# Patient Record
Sex: FEMALE | Race: WHITE | Employment: UNEMPLOYED | URBAN - METROPOLITAN AREA
[De-identification: names, ages, dates, MRNs, and addresses within clinical notes are randomized per-mention and may not be internally consistent; named-entity substitution may affect disease eponyms.]

---

## 2023-05-30 SDOH — HEALTH STABILITY: PHYSICAL HEALTH
ON AVERAGE, HOW MANY DAYS PER WEEK DO YOU ENGAGE IN MODERATE TO STRENUOUS EXERCISE (LIKE A BRISK WALK)?: PATIENT DECLINED

## 2023-05-31 ENCOUNTER — OFFICE VISIT (OUTPATIENT)
Dept: ORTHOPEDIC SURGERY | Age: 48
End: 2023-05-31
Payer: COMMERCIAL

## 2023-05-31 VITALS — HEIGHT: 68 IN | BODY MASS INDEX: 37.13 KG/M2 | RESPIRATION RATE: 14 BRPM | WEIGHT: 245 LBS

## 2023-05-31 DIAGNOSIS — M79.672 LEFT FOOT PAIN: Primary | ICD-10-CM

## 2023-05-31 PROCEDURE — 99204 OFFICE O/P NEW MOD 45 MIN: CPT | Performed by: PHYSICIAN ASSISTANT

## 2023-05-31 NOTE — PROGRESS NOTES
[address] [facility], [address] [place] [address] Road [address] [place] [address], [address] [facility]           Dept Phone: [phone]           Dept Fax:  [phone] [address] [facility]           Kushal Marte          Dept Phone: [phone]           Dept Fax:  [phone]      Chief Compliant:  Chief Complaint   Patient presents with    Foot Pain     left        History of Present Illness: This is a 57 y.o. female who presents to the clinic today for evaluation of approximately 1-1/2-week history of left foot pain. Patient reports pain started while setting up for her son's graduation party she does not recall any specific injury or trauma around the time of the onset of pain. Localized pain most severely to the dorsal aspect of the midfoot pointing to the area over the third and fourth metatarsals proximally. She denies any significant bruising but does note pretty consistent swelling to the foot however states she had this even before she had pain. No history of stress fracture or surgery to this left foot. Does have a history of bunionectomy on the right foot in the past..     Past History:  No current outpatient medications on file.   No Known Allergies  Social History     Socioeconomic History    Marital status:      Spouse name: Not on file    Number of children: Not on file    Years of education: Not on file    Highest education level: Not on file   Occupational History    Not on file   Tobacco Use    Smoking status: Never    Smokeless tobacco: Never   Substance and Sexual Activity    Alcohol use: Not Currently    Drug use: Never    Sexual activity: Not on file   Other Topics Concern    Not on file   Social History Narrative    Not on file     Social Determinants of Health     Financial Resource Strain: Not on file   Food Insecurity: Not on file   Transportation Needs: Not on

## 2023-06-21 ENCOUNTER — OFFICE VISIT (OUTPATIENT)
Dept: ORTHOPEDIC SURGERY | Age: 48
End: 2023-06-21
Payer: COMMERCIAL

## 2023-06-21 VITALS — HEIGHT: 68 IN | BODY MASS INDEX: 37.13 KG/M2 | RESPIRATION RATE: 14 BRPM | WEIGHT: 245 LBS

## 2023-06-21 DIAGNOSIS — M79.672 LEFT FOOT PAIN: Primary | ICD-10-CM

## 2023-06-21 PROCEDURE — 99212 OFFICE O/P EST SF 10 MIN: CPT | Performed by: PHYSICIAN ASSISTANT

## 2023-06-21 NOTE — PROGRESS NOTES
the distal metatarsal or first through fifth MTP joints. No tenderness about the ankle. Swelling: Some mild edema diffusely in the foot without evidence of erythema or ecchymosis. Range of Motion:       Dorsiflexion: 25     Plantar Flexion: 45     Subtalar Inversion: Normal     Eversion: Normal         Muscle Strength:       Dorsiflexion:  5/5     Plantar Flexion:  5/5     Anterior Tibial:  5/5     Gastrosoleus:  5/5     Posterior Tibial:  5/5     Peroneal muscle:  5/5         Anterior Drawer:  Negative   Varus Tilt:  Negative     Neurological: Patient is alert and oriented to person, place, and time. Normal strenght. No sensory deficit. Skin: Skin is warm and dry  Psychiatric: Behavior is normal. Thought content normal.  Nursing note and vitals reviewed. Labs and Imaging:     XR taken today:  No results found. X-rays taken in clinic and preliminarily reviewed by me 6/21/23:   3 views of the left foot again demonstrate linear lucency through the proximal aspect of the third metatarsal shaft, that appears to be less prominent than x-rays from 5/31/2023 consistent with healing stress fracture no evidence of other acute osseous abnormality. Assessment and Plan:  1. Left foot pain    2. Healing stress fracture of the third metatarsal          PLAN:  This is a 57 y.o. female who presents to the clinic today for follow up left foot pain concerning for nondisplaced stress fracture of the third metatarsal proximally. 1.  Patient notes resolution of pain after wearing walking boot for the last 3 weeks she reports she is doing very well today. 2.  X-rays demonstrate less prominence in area of concern consistent with bony healing and is nontender on examination today at this time we will discontinue boot and have patient gradually advance activity as tolerated. Follow-up on as needed basis    Please note that this chart was generated using voice recognition Dragon dictation software.   Although